# Patient Record
Sex: FEMALE | Race: BLACK OR AFRICAN AMERICAN | NOT HISPANIC OR LATINO | URBAN - METROPOLITAN AREA
[De-identification: names, ages, dates, MRNs, and addresses within clinical notes are randomized per-mention and may not be internally consistent; named-entity substitution may affect disease eponyms.]

---

## 2017-09-13 ENCOUNTER — HOSPITAL ENCOUNTER (EMERGENCY)
Facility: HOSPITAL | Age: 47
Discharge: HOME | End: 2017-09-13
Attending: EMERGENCY MEDICINE
Payer: COMMERCIAL

## 2017-09-13 VITALS
WEIGHT: 210 LBS | HEIGHT: 64 IN | SYSTOLIC BLOOD PRESSURE: 142 MMHG | RESPIRATION RATE: 18 BRPM | DIASTOLIC BLOOD PRESSURE: 69 MMHG | OXYGEN SATURATION: 98 % | HEART RATE: 72 BPM | TEMPERATURE: 98 F | BODY MASS INDEX: 35.85 KG/M2

## 2017-09-13 DIAGNOSIS — R07.9 CHEST PAIN: ICD-10-CM

## 2017-09-13 LAB
ALBUMIN SERPL BCP-MCNC: 3.6 G/DL
ALP SERPL-CCNC: 118 U/L
ALT SERPL W/O P-5'-P-CCNC: 20 U/L
ANION GAP SERPL CALC-SCNC: 10 MMOL/L
AST SERPL-CCNC: 17 U/L
BASOPHILS # BLD AUTO: 0.01 K/UL
BASOPHILS NFR BLD: 0.1 %
BILIRUB SERPL-MCNC: 0.3 MG/DL
BNP SERPL-MCNC: 24 PG/ML
BUN SERPL-MCNC: 11 MG/DL
CALCIUM SERPL-MCNC: 9 MG/DL
CHLORIDE SERPL-SCNC: 104 MMOL/L
CO2 SERPL-SCNC: 24 MMOL/L
CREAT SERPL-MCNC: 0.8 MG/DL
DIFFERENTIAL METHOD: ABNORMAL
EOSINOPHIL # BLD AUTO: 0.1 K/UL
EOSINOPHIL NFR BLD: 1.5 %
ERYTHROCYTE [DISTWIDTH] IN BLOOD BY AUTOMATED COUNT: 13.6 %
EST. GFR  (AFRICAN AMERICAN): >60 ML/MIN/1.73 M^2
EST. GFR  (NON AFRICAN AMERICAN): >60 ML/MIN/1.73 M^2
GLUCOSE SERPL-MCNC: 238 MG/DL
HCT VFR BLD AUTO: 37.3 %
HGB BLD-MCNC: 12.4 G/DL
LYMPHOCYTES # BLD AUTO: 1.9 K/UL
LYMPHOCYTES NFR BLD: 23.2 %
MCH RBC QN AUTO: 26.5 PG
MCHC RBC AUTO-ENTMCNC: 33.2 G/DL
MCV RBC AUTO: 80 FL
MONOCYTES # BLD AUTO: 0.4 K/UL
MONOCYTES NFR BLD: 4.2 %
NEUTROPHILS # BLD AUTO: 5.9 K/UL
NEUTROPHILS NFR BLD: 71 %
PLATELET # BLD AUTO: 261 K/UL
PMV BLD AUTO: 9.6 FL
POTASSIUM SERPL-SCNC: 3.9 MMOL/L
PROT SERPL-MCNC: 7.3 G/DL
RBC # BLD AUTO: 4.68 M/UL
SODIUM SERPL-SCNC: 138 MMOL/L
TROPONIN I SERPL DL<=0.01 NG/ML-MCNC: <0.006 NG/ML
WBC # BLD AUTO: 8.26 K/UL

## 2017-09-13 PROCEDURE — 96374 THER/PROPH/DIAG INJ IV PUSH: CPT

## 2017-09-13 PROCEDURE — 93010 ELECTROCARDIOGRAM REPORT: CPT | Mod: ,,, | Performed by: INTERNAL MEDICINE

## 2017-09-13 PROCEDURE — 96361 HYDRATE IV INFUSION ADD-ON: CPT

## 2017-09-13 PROCEDURE — 25000003 PHARM REV CODE 250: Performed by: EMERGENCY MEDICINE

## 2017-09-13 PROCEDURE — 93005 ELECTROCARDIOGRAM TRACING: CPT

## 2017-09-13 PROCEDURE — 83880 ASSAY OF NATRIURETIC PEPTIDE: CPT

## 2017-09-13 PROCEDURE — 80053 COMPREHEN METABOLIC PANEL: CPT

## 2017-09-13 PROCEDURE — 84484 ASSAY OF TROPONIN QUANT: CPT

## 2017-09-13 PROCEDURE — 99284 EMERGENCY DEPT VISIT MOD MDM: CPT | Mod: 25

## 2017-09-13 PROCEDURE — 85025 COMPLETE CBC W/AUTO DIFF WBC: CPT

## 2017-09-13 PROCEDURE — 63600175 PHARM REV CODE 636 W HCPCS: Performed by: EMERGENCY MEDICINE

## 2017-09-13 RX ORDER — ASPIRIN 325 MG
325 TABLET ORAL
Status: COMPLETED | OUTPATIENT
Start: 2017-09-13 | End: 2017-09-13

## 2017-09-13 RX ORDER — MORPHINE SULFATE 10 MG/ML
6 INJECTION INTRAMUSCULAR; INTRAVENOUS; SUBCUTANEOUS
Status: COMPLETED | OUTPATIENT
Start: 2017-09-13 | End: 2017-09-13

## 2017-09-13 RX ORDER — LISINOPRIL 10 MG/1
10 TABLET ORAL DAILY
COMMUNITY

## 2017-09-13 RX ORDER — ATORVASTATIN CALCIUM 10 MG/1
10 TABLET, FILM COATED ORAL DAILY
COMMUNITY

## 2017-09-13 RX ORDER — INSULIN GLARGINE 100 [IU]/ML
INJECTION, SOLUTION SUBCUTANEOUS NIGHTLY
COMMUNITY

## 2017-09-13 RX ORDER — NITROGLYCERIN 0.4 MG/1
0.4 TABLET SUBLINGUAL EVERY 5 MIN PRN
Status: DISCONTINUED | OUTPATIENT
Start: 2017-09-13 | End: 2017-09-13 | Stop reason: HOSPADM

## 2017-09-13 RX ADMIN — SODIUM CHLORIDE 1000 ML: 0.9 INJECTION, SOLUTION INTRAVENOUS at 08:09

## 2017-09-13 RX ADMIN — MORPHINE SULFATE 6 MG: 10 INJECTION INTRAVENOUS at 08:09

## 2017-09-13 RX ADMIN — ASPIRIN 325 MG ORAL TABLET 325 MG: 325 PILL ORAL at 08:09

## 2017-09-13 NOTE — ED NOTES
PRN nitro not given at this time. Pt denies chest pain, chest pressure or any chest discomfort. Pt instructed to use call light if she begins feeling any chest discomfort.

## 2017-09-13 NOTE — ED TRIAGE NOTES
Pt arrived to ED via personal transport with left shoulder and neck pain, head along with nausea and diarrhea x 4 days. Pt also reports intermittent, sharp, left sided chest pain beginning last night. PT denies SOB, vomiting, or tingling or numbness of the extremities.

## 2017-09-13 NOTE — ED PROVIDER NOTES
"Encounter Date: 2017    SCRIBE #1 NOTE: I, Clifton Montaño, am scribing for, and in the presence of,  Fabian Ansari MD. I have scribed the following portions of the note - Other sections scribed: HPI, ROS and PE.       History     Chief Complaint   Patient presents with    Chest Pain     "since  I've been having neck pain radiating down left arm and then yesterday I started having chest pain which feels likes (intermittent) pins, on my left side;" hx of HTN and DM    Neck Pain     CC: Chest Pain and Neck Pain     HPI: This 46 y.o F with DM, and presumable HTN as on lisinopril presents to the ED c/o acute onset of constant and severe (8/10) posterior neck pain radiating down the LUE x3 days. The pt also reports L side chest pain which began at approximately 1900 yesterday while the pt was sleeping. The pt describes her chest pain as a "sticking pain." The pt also reports nausea. Pt denies fever, chills, constipation, diarrhea, dysuria, difficulty urinating and emesis. Pt attempted tx with Tylenol this AM with no relief.       The history is provided by the patient. No  was used.     Review of patient's allergies indicates:  No Known Allergies  Past Medical History:   Diagnosis Date    Diabetes mellitus     Hyperlipidemia      Past Surgical History:   Procedure Laterality Date     SECTION      TUBAL LIGATION       History reviewed. No pertinent family history.  Social History   Substance Use Topics    Smoking status: Never Smoker    Smokeless tobacco: Never Used    Alcohol use Yes      Comment: ocassionally     Review of Systems   Constitutional: Negative for chills, diaphoresis and fever.   HENT: Negative for rhinorrhea and sore throat.    Eyes: Negative for redness.   Respiratory: Negative for cough and shortness of breath.    Cardiovascular: Negative for chest pain.   Gastrointestinal: Positive for nausea. Negative for abdominal pain, diarrhea and vomiting. "   Endocrine: Negative for polyuria.   Genitourinary: Negative for dysuria, frequency and urgency.   Musculoskeletal: Positive for neck pain (posterior). Negative for back pain.        (+) LUE pain   Skin: Negative for rash.   Allergic/Immunologic: Negative for immunocompromised state.   Neurological: Negative for numbness and headaches.   Hematological: Does not bruise/bleed easily.   Psychiatric/Behavioral: The patient is not nervous/anxious.    All other systems reviewed and are negative.      Physical Exam     Initial Vitals [09/13/17 0747]   BP Pulse Resp Temp SpO2   (!) 146/77 76 20 98.7 °F (37.1 °C) 99 %      MAP       100         Physical Exam  PHYSICAL EXAM:    GEN:   NAD, A & Ox3, atraumatic, well appearing, nontoxic  HEENT:  PERRLA, EOMI, moist membranes, nl conjunctiva, no scleral icterus, no nystagmus, no nodes/nodules, soft, supple, FROM, no trachial deviation, nexus negative  CV:   RRR no m/r/g, 2+ radial pulses, <2sec cap refill  RESP:  CTA B, no w/r/r, equal and bilateral chest rise, no respiratory distress  ABD:   soft, Nontender, Nondistended, +BS  BACK:  FROM, no midline tenderness, no paraspinal tenderness  :   Deferred  EXT:   FROM, TORREZ x 4, no edema, no calf tenderness, no swelling  LYMPH:  no gross adenopathy  NEURO:  CN II-XII grossly intact, no obvious motor/sensory deficit  PSYCH:   no SI/HI, no anxiety, nl mood/affect, nl judgement/thought process  SKIN:  Warm, dry, intact, no rashes/lesions or masses, nl color, no pallor    ED Course   Procedures  Labs Reviewed   CBC W/ AUTO DIFFERENTIAL - Abnormal; Notable for the following:        Result Value    MCV 80 (*)     MCH 26.5 (*)     All other components within normal limits   COMPREHENSIVE METABOLIC PANEL - Abnormal; Notable for the following:     Glucose 238 (*)     All other components within normal limits   TROPONIN I   B-TYPE NATRIURETIC PEPTIDE     EKG Readings: (Independently Interpreted)   Initial Reading: No STEMI. Rhythm: Normal  Sinus Rhythm. Heart Rate: 74 bpm.   8:36:11    Low Voltage QRS  Borderline ECG          Medical Decision Making:   Differential Diagnosis:   Chest pain differential includes but not exclusive to myocardial infarction, pulmonary embolism, aortic dissection, costochondritis, GERD, pneumonia, malingering      Clinical Tests:   Lab Tests: Ordered and Reviewed  The following lab test(s) were unremarkable: CBC, BMP, BNP and Troponin  Radiological Study: Ordered and Reviewed    Additional MDM:   Chest Pain: Medications: Aspirin and Morphine. Pain response to treatment: decreased. Initial EKG: no acute changes. Initial Disposition: Discharged. The patient's condition was felt to be stable.   Hypertension: The patient has hypertension (no treatment required at this time).          Scribe Attestation:   Scribe #1: I performed the above scribed service and the documentation accurately describes the services I performed. I attest to the accuracy of the note.    Attending Attestation:           Physician Attestation for Scribe:  Physician Attestation Statement for Scribe #1: I, Fabian Ansari MD, reviewed documentation, as scribed by Clifton Montaño in my presence, and it is both accurate and complete.                 ED Course      Clinical Impression:   The encounter diagnosis was Chest pain.    Disposition:   Disposition: Discharged  Condition: Stable                        Fabian Ansari MD  09/27/17 0954

## 2017-09-13 NOTE — DISCHARGE INSTRUCTIONS
You may use over the counter medication for your pain and discomfort.  Consider taking a daily aspirin while you wait for full evaluation with your primary care physician.

## 2021-02-20 ENCOUNTER — IMMUNIZATION (OUTPATIENT)
Dept: PRIMARY CARE CLINIC | Facility: CLINIC | Age: 51
End: 2021-02-20

## 2021-02-20 DIAGNOSIS — Z23 NEED FOR VACCINATION: Primary | ICD-10-CM

## 2021-02-20 PROCEDURE — 0001A PR IMMUNIZ ADMIN, SARS-COV-2 COVID-19 VACC, 30MCG/0.3ML, 1ST DOSE: ICD-10-PCS | Mod: CV19,S$GLB,, | Performed by: INTERNAL MEDICINE

## 2021-02-20 PROCEDURE — 0001A PR IMMUNIZ ADMIN, SARS-COV-2 COVID-19 VACC, 30MCG/0.3ML, 1ST DOSE: CPT | Mod: CV19,S$GLB,, | Performed by: INTERNAL MEDICINE

## 2021-02-20 PROCEDURE — 91300 PR SARS-COV- 2 COVID-19 VACCINE, NO PRSV, 30MCG/0.3ML, IM: ICD-10-PCS | Mod: S$GLB,,, | Performed by: INTERNAL MEDICINE

## 2021-02-20 PROCEDURE — 91300 PR SARS-COV- 2 COVID-19 VACCINE, NO PRSV, 30MCG/0.3ML, IM: CPT | Mod: S$GLB,,, | Performed by: INTERNAL MEDICINE

## 2021-02-20 RX ADMIN — Medication 0.3 ML: at 03:02

## 2021-03-13 ENCOUNTER — IMMUNIZATION (OUTPATIENT)
Dept: PRIMARY CARE CLINIC | Facility: CLINIC | Age: 51
End: 2021-03-13
Payer: COMMERCIAL

## 2021-03-13 DIAGNOSIS — Z23 NEED FOR VACCINATION: Primary | ICD-10-CM

## 2021-03-13 PROCEDURE — 91300 PR SARS-COV- 2 COVID-19 VACCINE, NO PRSV, 30MCG/0.3ML, IM: CPT | Mod: S$GLB,,, | Performed by: INTERNAL MEDICINE

## 2021-03-13 PROCEDURE — 0002A PR IMMUNIZ ADMIN, SARS-COV-2 COVID-19 VACC, 30MCG/0.3ML, 2ND DOSE: ICD-10-PCS | Mod: CV19,S$GLB,, | Performed by: INTERNAL MEDICINE

## 2021-03-13 PROCEDURE — 0002A PR IMMUNIZ ADMIN, SARS-COV-2 COVID-19 VACC, 30MCG/0.3ML, 2ND DOSE: CPT | Mod: CV19,S$GLB,, | Performed by: INTERNAL MEDICINE

## 2021-03-13 PROCEDURE — 91300 PR SARS-COV- 2 COVID-19 VACCINE, NO PRSV, 30MCG/0.3ML, IM: ICD-10-PCS | Mod: S$GLB,,, | Performed by: INTERNAL MEDICINE

## 2021-03-13 RX ADMIN — Medication 0.3 ML: at 02:03

## 2022-12-27 ENCOUNTER — OFFICE VISIT (OUTPATIENT)
Dept: URGENT CARE | Facility: CLINIC | Age: 52
End: 2022-12-27
Payer: COMMERCIAL

## 2022-12-27 VITALS
HEIGHT: 64 IN | BODY MASS INDEX: 30.9 KG/M2 | TEMPERATURE: 99 F | DIASTOLIC BLOOD PRESSURE: 75 MMHG | WEIGHT: 181 LBS | OXYGEN SATURATION: 96 % | SYSTOLIC BLOOD PRESSURE: 126 MMHG | RESPIRATION RATE: 17 BRPM | HEART RATE: 65 BPM

## 2022-12-27 DIAGNOSIS — J32.9 SINUSITIS, UNSPECIFIED CHRONICITY, UNSPECIFIED LOCATION: Primary | ICD-10-CM

## 2022-12-27 DIAGNOSIS — H92.03 EAR PAIN, BILATERAL: ICD-10-CM

## 2022-12-27 LAB
CTP QC/QA: YES
SARS-COV-2 AG RESP QL IA.RAPID: NEGATIVE

## 2022-12-27 PROCEDURE — 3008F BODY MASS INDEX DOCD: CPT | Mod: CPTII,S$GLB,, | Performed by: NURSE PRACTITIONER

## 2022-12-27 PROCEDURE — 1160F RVW MEDS BY RX/DR IN RCRD: CPT | Mod: CPTII,S$GLB,, | Performed by: NURSE PRACTITIONER

## 2022-12-27 PROCEDURE — 87811 SARS CORONAVIRUS 2 ANTIGEN POCT, MANUAL READ: ICD-10-PCS | Mod: QW,S$GLB,, | Performed by: NURSE PRACTITIONER

## 2022-12-27 PROCEDURE — 4010F PR ACE/ARB THEARPY RXD/TAKEN: ICD-10-PCS | Mod: CPTII,S$GLB,, | Performed by: NURSE PRACTITIONER

## 2022-12-27 PROCEDURE — 99203 OFFICE O/P NEW LOW 30 MIN: CPT | Mod: S$GLB,,, | Performed by: NURSE PRACTITIONER

## 2022-12-27 PROCEDURE — 4010F ACE/ARB THERAPY RXD/TAKEN: CPT | Mod: CPTII,S$GLB,, | Performed by: NURSE PRACTITIONER

## 2022-12-27 PROCEDURE — 87811 SARS-COV-2 COVID19 W/OPTIC: CPT | Mod: QW,S$GLB,, | Performed by: NURSE PRACTITIONER

## 2022-12-27 PROCEDURE — 1159F MED LIST DOCD IN RCRD: CPT | Mod: CPTII,S$GLB,, | Performed by: NURSE PRACTITIONER

## 2022-12-27 PROCEDURE — 1159F PR MEDICATION LIST DOCUMENTED IN MEDICAL RECORD: ICD-10-PCS | Mod: CPTII,S$GLB,, | Performed by: NURSE PRACTITIONER

## 2022-12-27 PROCEDURE — 1160F PR REVIEW ALL MEDS BY PRESCRIBER/CLIN PHARMACIST DOCUMENTED: ICD-10-PCS | Mod: CPTII,S$GLB,, | Performed by: NURSE PRACTITIONER

## 2022-12-27 PROCEDURE — 99203 PR OFFICE/OUTPT VISIT, NEW, LEVL III, 30-44 MIN: ICD-10-PCS | Mod: S$GLB,,, | Performed by: NURSE PRACTITIONER

## 2022-12-27 PROCEDURE — 3008F PR BODY MASS INDEX (BMI) DOCUMENTED: ICD-10-PCS | Mod: CPTII,S$GLB,, | Performed by: NURSE PRACTITIONER

## 2022-12-27 PROCEDURE — 3074F SYST BP LT 130 MM HG: CPT | Mod: CPTII,S$GLB,, | Performed by: NURSE PRACTITIONER

## 2022-12-27 PROCEDURE — 3078F DIAST BP <80 MM HG: CPT | Mod: CPTII,S$GLB,, | Performed by: NURSE PRACTITIONER

## 2022-12-27 PROCEDURE — 3078F PR MOST RECENT DIASTOLIC BLOOD PRESSURE < 80 MM HG: ICD-10-PCS | Mod: CPTII,S$GLB,, | Performed by: NURSE PRACTITIONER

## 2022-12-27 PROCEDURE — 3074F PR MOST RECENT SYSTOLIC BLOOD PRESSURE < 130 MM HG: ICD-10-PCS | Mod: CPTII,S$GLB,, | Performed by: NURSE PRACTITIONER

## 2022-12-27 RX ORDER — LEVOCETIRIZINE DIHYDROCHLORIDE 5 MG/1
5 TABLET, FILM COATED ORAL NIGHTLY
Qty: 30 TABLET | Refills: 0 | Status: SHIPPED | OUTPATIENT
Start: 2022-12-27 | End: 2022-12-27

## 2022-12-27 RX ORDER — FLUTICASONE PROPIONATE 50 MCG
2 SPRAY, SUSPENSION (ML) NASAL DAILY
Qty: 18.2 ML | Refills: 0 | Status: SHIPPED | OUTPATIENT
Start: 2022-12-27 | End: 2022-12-27

## 2022-12-27 RX ORDER — EVOLOCUMAB 140 MG/ML
INJECTION, SOLUTION SUBCUTANEOUS
COMMUNITY
Start: 2022-11-06

## 2022-12-27 RX ORDER — LEVOTHYROXINE SODIUM 125 UG/1
125 TABLET ORAL
COMMUNITY
Start: 2022-08-31

## 2022-12-27 NOTE — PATIENT INSTRUCTIONS
"If your condition worsens or fails to improve, we recommend that you receive another evaluation at the ER immediately, contact your PCP to discuss your concerns, or return here.  You must understand that you've received an urgent care treatment only, and that you may be released before all your medical problems are known or treated.  You, the patient, will arrange for follow-up care as instructed.     If we discussed that I think your illness is viral, it will not respond to antibiotics and it will last 10-14 days.  However, if we discussed a "wait and see" approach, if over the next few days the symptoms worsen, start the antibiotics I have given you.  If we discussed that you require antibiotics now, start them now and take them to completion.     If you are female and on birth control pills and take the antibiotics, use additional methods to prevent pregnancy while on the antibiotics and for one cycle after.     Flonase (fluticasone) is a nasal spray which is available over the counter and may help with your symptoms.  Zyrtec D, Claritin D, or Allegra D can also help with symptoms of congestion and drainage.  If you have hypertension avoid using the "D" formula, which is the decongestant.  If you just have drainage, you can take plain Zyrtec, Claritin, or Allegra.  If you just have a congested feeling, you can take pseudoephedrine (unless you have high blood pressure) which you have to sign for behind the counter.  Do not buy phenylephrine OTC, as it is not effective     Rest and fluids are also important.  Tylenol or Ibuprofen can also be used as directed for pain, unless you have an allergy to them or medical condition (such as stomach ulcers, kidney or liver disease, or use blood thinners, etc.) for which you should not be taking these type of medications.     If you are flying in the next few days, Afrin nose drops for the airplane flight upon take off and landing may help.  Other than at those times, refrain " from using Afrin.     If you were prescribed a narcotic or sedating cough medicine, do not drive or operate heavy machinery while taking these medications.

## 2022-12-27 NOTE — PROGRESS NOTES
"Subjective:       Patient ID: Kathe Saucedo is a 52 y.o. female.    Vitals:  height is 5' 4" (1.626 m) and weight is 82.1 kg (181 lb). Her oral temperature is 98.5 °F (36.9 °C). Her blood pressure is 126/75 and her pulse is 65. Her respiration is 17 and oxygen saturation is 96%.     Chief Complaint: Otalgia    51yo female pt reports BL ear pain, right worse than left, over the past 3 days.  Reports that she tested positive with at-home COVID test 6 days ago, reports worsening sinus and BL ear pain since then, reports no improvement with Tylenol or ibuprofen, denies any other medications or treatments.  Denies hearing loss, denies ear drainage.  Denies current fever/chills, n/v/d, chest pain, wheezing, or SOB.  Requests COVID test in clinic today.    Otalgia   There is pain in both ears. Episode onset: 4 days ago. The problem has been gradually worsening. There has been no fever. The pain is at a severity of 8/10. The pain is moderate. Associated symptoms include headaches. Pertinent negatives include no coughing, diarrhea, ear discharge, hearing loss, sore throat or vomiting. Associated symptoms comments: Sneezing . Treatments tried: ibuprofen. There is no history of a chronic ear infection, hearing loss or a tympanostomy tube.     Constitution: Negative for chills and fever.   HENT:  Positive for ear pain, congestion, sinus pain and sinus pressure. Negative for ear discharge, tinnitus, hearing loss and sore throat.    Cardiovascular:  Negative for chest pain.   Respiratory:  Negative for cough, shortness of breath and wheezing.    Gastrointestinal:  Negative for nausea, vomiting and diarrhea.   Neurological:  Positive for headaches.     Objective:      Physical Exam   Constitutional: She is oriented to person, place, and time. She appears well-developed. She is cooperative.  Non-toxic appearance. She does not appear ill. No distress.   HENT:   Head: Normocephalic and atraumatic.   Ears:   Right Ear: Hearing, " external ear and ear canal normal. There is tenderness. Tympanic membrane is bulging. Tympanic membrane is not erythematous and not retracted. A middle ear effusion (clear fluid) is present.   Left Ear: Hearing, external ear and ear canal normal. No tenderness. Tympanic membrane is bulging. Tympanic membrane is not erythematous and not retracted. A middle ear effusion (clear fluid) is present.   Nose: Mucosal edema (swelling to BL turbinates) present. No rhinorrhea or nasal deformity. No epistaxis. Right sinus exhibits maxillary sinus tenderness. Right sinus exhibits no frontal sinus tenderness. Left sinus exhibits maxillary sinus tenderness. Left sinus exhibits no frontal sinus tenderness.   Mouth/Throat: Uvula is midline and mucous membranes are normal. No trismus in the jaw. Normal dentition. No uvula swelling. Oropharyngeal exudate (clear postnasal drip) present. No posterior oropharyngeal edema or posterior oropharyngeal erythema. Tonsils are 1+ on the right. Tonsils are 1+ on the left. No tonsillar exudate.   Eyes: Conjunctivae and lids are normal. No scleral icterus.   Neck: Trachea normal and phonation normal. Neck supple. No edema present. No erythema present. No neck rigidity present.   Cardiovascular: Normal rate, regular rhythm, normal heart sounds and normal pulses.   Pulmonary/Chest: Effort normal and breath sounds normal. No accessory muscle usage or stridor. No tachypnea. No respiratory distress. She has no decreased breath sounds. She has no wheezes. She has no rhonchi. She has no rales.   Abdominal: Normal appearance.   Musculoskeletal: Normal range of motion.         General: No deformity. Normal range of motion.   Lymphadenopathy:        Head (right side): No submandibular adenopathy present.        Head (left side): No submandibular adenopathy present.     She has no cervical adenopathy.   Neurological: She is alert and oriented to person, place, and time. She exhibits normal muscle tone.  "Coordination normal.   Skin: Skin is warm, dry, intact, not diaphoretic and not pale.   Psychiatric: Her speech is normal and behavior is normal. Judgment and thought content normal.   Nursing note and vitals reviewed.    Results for orders placed or performed in visit on 12/27/22   SARS Coronavirus 2 Antigen, POCT Manual Read   Result Value Ref Range    SARS Coronavirus 2 Antigen Negative Negative     Acceptable Yes            Assessment:       1. Sinusitis, unspecified chronicity, unspecified location    2. Ear pain, bilateral          Plan:       Provided education on prescribed medications, recommended continuing ibuprofen and adding cool compresses to sinuses for additional symptom relief.  Provided education on return/ER precautions.  Pt verbalized understanding and agreed to plan.      Sinusitis, unspecified chronicity, unspecified location  -     fluticasone propionate (FLONASE) 50 mcg/actuation nasal spray; 2 sprays (100 mcg total) by Each Nostril route once daily.  Dispense: 18.2 mL; Refill: 0  -     levocetirizine (XYZAL) 5 MG tablet; Take 1 tablet (5 mg total) by mouth every evening.  Dispense: 30 tablet; Refill: 0    Ear pain, bilateral  -     SARS Coronavirus 2 Antigen, POCT Manual Read      Patient Instructions   If your condition worsens or fails to improve, we recommend that you receive another evaluation at the ER immediately, contact your PCP to discuss your concerns, or return here.  You must understand that you've received an urgent care treatment only, and that you may be released before all your medical problems are known or treated.  You, the patient, will arrange for follow-up care as instructed.     If we discussed that I think your illness is viral, it will not respond to antibiotics and it will last 10-14 days.  However, if we discussed a "wait and see" approach, if over the next few days the symptoms worsen, start the antibiotics I have given you.  If we discussed that you " "require antibiotics now, start them now and take them to completion.     If you are female and on birth control pills and take the antibiotics, use additional methods to prevent pregnancy while on the antibiotics and for one cycle after.     Flonase (fluticasone) is a nasal spray which is available over the counter and may help with your symptoms.  Zyrtec D, Claritin D, or Allegra D can also help with symptoms of congestion and drainage.  If you have hypertension avoid using the "D" formula, which is the decongestant.  If you just have drainage, you can take plain Zyrtec, Claritin, or Allegra.  If you just have a congested feeling, you can take pseudoephedrine (unless you have high blood pressure) which you have to sign for behind the counter.  Do not buy phenylephrine OTC, as it is not effective     Rest and fluids are also important.  Tylenol or Ibuprofen can also be used as directed for pain, unless you have an allergy to them or medical condition (such as stomach ulcers, kidney or liver disease, or use blood thinners, etc.) for which you should not be taking these type of medications.     If you are flying in the next few days, Afrin nose drops for the airplane flight upon take off and landing may help.  Other than at those times, refrain from using Afrin.     If you were prescribed a narcotic or sedating cough medicine, do not drive or operate heavy machinery while taking these medications.                        "